# Patient Record
Sex: FEMALE | Race: WHITE | Employment: UNEMPLOYED | URBAN - METROPOLITAN AREA
[De-identification: names, ages, dates, MRNs, and addresses within clinical notes are randomized per-mention and may not be internally consistent; named-entity substitution may affect disease eponyms.]

---
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
## 2023-04-24 ENCOUNTER — TELEPHONE (OUTPATIENT)
Dept: PEDIATRICS CLINIC | Age: 23
End: 2023-04-24

## 2025-02-17 NOTE — PROGRESS NOTES
Doctor: Randy  Caller Name: Leesa   #: [phone] option 3    [facility] called to speak to clinical team regarding Xray 02/13/25 ordered by provider.    Warm Transferred to [facility]   Please let family know that labs appear normal including thyroid and iron assessments. Work on relaxation, restarting therapies for anxiety, and Zantac.   Keep diary and fu with dr Stella Youssef in about 2 weeks to reassess and be sure other evaluations not necessary going forward please